# Patient Record
Sex: MALE | Race: WHITE | Employment: OTHER | URBAN - METROPOLITAN AREA
[De-identification: names, ages, dates, MRNs, and addresses within clinical notes are randomized per-mention and may not be internally consistent; named-entity substitution may affect disease eponyms.]

---

## 2022-05-11 ENCOUNTER — OFFICE VISIT (OUTPATIENT)
Dept: FAMILY MEDICINE CLINIC | Age: 67
End: 2022-05-11
Payer: MEDICARE

## 2022-05-11 VITALS
OXYGEN SATURATION: 99 % | TEMPERATURE: 97.1 F | DIASTOLIC BLOOD PRESSURE: 89 MMHG | HEART RATE: 82 BPM | BODY MASS INDEX: 30.31 KG/M2 | HEIGHT: 68 IN | SYSTOLIC BLOOD PRESSURE: 156 MMHG | WEIGHT: 200 LBS | RESPIRATION RATE: 18 BRPM

## 2022-05-11 DIAGNOSIS — M25.532 LEFT WRIST PAIN: Primary | ICD-10-CM

## 2022-05-11 PROCEDURE — 99213 OFFICE O/P EST LOW 20 MIN: CPT | Performed by: NURSE PRACTITIONER

## 2022-05-11 PROCEDURE — G8427 DOCREV CUR MEDS BY ELIG CLIN: HCPCS | Performed by: NURSE PRACTITIONER

## 2022-05-11 PROCEDURE — 1123F ACP DISCUSS/DSCN MKR DOCD: CPT | Performed by: NURSE PRACTITIONER

## 2022-05-11 PROCEDURE — G8417 CALC BMI ABV UP PARAM F/U: HCPCS | Performed by: NURSE PRACTITIONER

## 2022-05-11 PROCEDURE — 1036F TOBACCO NON-USER: CPT | Performed by: NURSE PRACTITIONER

## 2022-05-11 PROCEDURE — 3017F COLORECTAL CA SCREEN DOC REV: CPT | Performed by: NURSE PRACTITIONER

## 2022-05-11 PROCEDURE — 4040F PNEUMOC VAC/ADMIN/RCVD: CPT | Performed by: NURSE PRACTITIONER

## 2022-05-11 RX ORDER — TAMSULOSIN HYDROCHLORIDE 0.4 MG/1
CAPSULE ORAL
COMMUNITY

## 2022-05-11 RX ORDER — ROSUVASTATIN CALCIUM 10 MG/1
TABLET, COATED ORAL
COMMUNITY
Start: 2021-11-27

## 2022-05-11 RX ORDER — METOPROLOL SUCCINATE 25 MG/1
TABLET, EXTENDED RELEASE ORAL
COMMUNITY
Start: 2022-03-10

## 2022-05-11 RX ORDER — GLIMEPIRIDE 2 MG/1
TABLET ORAL
COMMUNITY
Start: 2022-03-10

## 2022-05-11 RX ORDER — ASPIRIN 81 MG/1
TABLET ORAL
COMMUNITY

## 2022-05-11 RX ORDER — PREDNISONE 10 MG/1
10 TABLET ORAL 2 TIMES DAILY
Qty: 10 TABLET | Refills: 0 | Status: SHIPPED | OUTPATIENT
Start: 2022-05-11 | End: 2022-05-16

## 2022-05-11 RX ORDER — DIGOXIN 250 MCG
TABLET ORAL
COMMUNITY

## 2022-05-11 RX ORDER — WARFARIN SODIUM 5 MG/1
TABLET ORAL
COMMUNITY
Start: 2022-01-26

## 2022-05-11 NOTE — PROGRESS NOTES
Chief Complaint       Wrist Injury (hurt wrist on shovel, x 1 1/2 days, has been icing it, no OTC meds)    History of Present Illness   Source of history provided by:  patient. Osorio Dhillon is a 74 y.o. old male presenting to the walk in clinic for evaluation of left wrist pain. Pt reports injuring the site while shoveling & overuse. Reports associated swelling and denies bruising. Denies any paresthesias, obvious deformity, finger pain, elbow pain, weakness, fever, chills, N/V, or abrasions. Pt states there is increased pain with active ROM. Has tried taking no OTC with minimal symptomatic relief. Denies any hx of previous injuries or surgeries at the site. Monitors BP & BS (reports  AM consistent) at home. Coumadin was drawn yesterday & elevated. He is right hand dominate. ROS    Unless otherwise stated in this report or unable to obtain because of the patient's clinical or mental status as evidenced by the medical record, this patients's positive and negative responses for Review of Systems, constitutional, psych, eyes, ENT, cardiovascular, respiratory, gastrointestinal, neurological, genitourinary, musculoskeletal, integument systems and systems related to the presenting problem are either stated in the preceding or were not pertinent or were negative for the symptoms and/or complaints related to the medical problem. Past Medical History:  has no past medical history on file. Past Surgical History:  has no past surgical history on file. Social History:  reports that he has never smoked. He has never used smokeless tobacco.  Family History: family history is not on file. Allergies: Patient has no known allergies.     Physical Exam         VS:  BP (!) 156/89   Pulse 82   Temp 97.1 °F (36.2 °C)   Resp 18   Ht 5' 8\" (1.727 m)   Wt 200 lb (90.7 kg)   SpO2 99%   BMI 30.41 kg/m²    Oxygen Saturation Interpretation: Normal.    Constitutional:  A&Ox3, development consistent with age, NAD.  CV: Heart RRR, no murmurs, rubs, or gallops. Lungs: CTAB without W/R/R. Wrist: Tenderness: Mild            Swelling: Mild             Deformity: No obvious deformity noted. ROM: Decreased ROM due to pain. Skin:  No erythema, rashes, or abrasions noted. Neurovascular: Motor deficit: /UE strength 5/5 bilaterally. No increased pain with supination or pronation of the hand. Sensory deficit:   Sensation intact proximally and distally to the injury site. Pulse deficit: 2+ and bounding. Capillary refill: Less then 2 sec throughout. Hand: Left              Tenderness: None              Swelling: None. Deformity: No obvious deformity. No malrotation noted. ROM: Decreased ROM due to pain. Skin:  No abrasions, erythema, or rashes noted. Neurological:  Alert and oriented. Motor functions intact. Lab / Imaging Results   (All laboratory and radiology results have been personally reviewed by myself)  Labs:  No results found for this visit on 05/11/22. Imaging: All Radiology results interpreted by Radiologist unless otherwise noted. Assessment / Plan     Impression(s):  Mina Claros was seen today for wrist injury. Diagnoses and all orders for this visit:    Left wrist pain  -     predniSONE (DELTASONE) 10 MG tablet; Take 1 tablet by mouth 2 times daily for 5 days  - Wrapped wrist in ACE bandage & discussed YANIQUE, Tylenol use  - Defer pt request for imaging at this time  - Monitor BS at home while taking steroids.  - Pt has ordered a wrist splint, should be arriving today    Disposition:  Disposition: Discharge to home. Pt was provided with a ace bandage in office. Script written, side effects discussed. RICE protocol advised. ED sooner if symptoms worsen or change. ED immediately with any severe/worsening pain, paresthesias, weakness, fever, nausea, or vomiting.  Pt states understanding and is in agreement with this care plan. All questions answered. Keara Hardy, APRN - CNP    **This report was transcribed using voice recognition software. Every effort was made to ensure accuracy; however, inadvertent computerized transcription errors may be present.